# Patient Record
Sex: FEMALE | Race: WHITE | NOT HISPANIC OR LATINO
[De-identification: names, ages, dates, MRNs, and addresses within clinical notes are randomized per-mention and may not be internally consistent; named-entity substitution may affect disease eponyms.]

---

## 2021-11-04 ENCOUNTER — APPOINTMENT (OUTPATIENT)
Dept: PEDIATRIC ORTHOPEDIC SURGERY | Facility: CLINIC | Age: 71
End: 2021-11-04
Payer: MEDICARE

## 2021-11-04 VITALS — WEIGHT: 160 LBS | BODY MASS INDEX: 29.44 KG/M2 | HEIGHT: 62 IN

## 2021-11-04 DIAGNOSIS — Z83.3 FAMILY HISTORY OF DIABETES MELLITUS: ICD-10-CM

## 2021-11-04 DIAGNOSIS — Z80.9 FAMILY HISTORY OF MALIGNANT NEOPLASM, UNSPECIFIED: ICD-10-CM

## 2021-11-04 DIAGNOSIS — Z82.49 FAMILY HISTORY OF ISCHEMIC HEART DISEASE AND OTHER DISEASES OF THE CIRCULATORY SYSTEM: ICD-10-CM

## 2021-11-04 DIAGNOSIS — Z86.79 PERSONAL HISTORY OF OTHER DISEASES OF THE CIRCULATORY SYSTEM: ICD-10-CM

## 2021-11-04 DIAGNOSIS — Z82.61 FAMILY HISTORY OF ARTHRITIS: ICD-10-CM

## 2021-11-04 DIAGNOSIS — S46.011A STRAIN OF MUSCLE(S) AND TENDON(S) OF THE ROTATOR CUFF OF RIGHT SHOULDER, INITIAL ENCOUNTER: ICD-10-CM

## 2021-11-04 PROBLEM — Z00.00 ENCOUNTER FOR PREVENTIVE HEALTH EXAMINATION: Status: ACTIVE | Noted: 2021-11-04

## 2021-11-04 PROCEDURE — 99212 OFFICE O/P EST SF 10 MIN: CPT

## 2021-11-04 PROCEDURE — 73030 X-RAY EXAM OF SHOULDER: CPT

## 2021-11-04 RX ORDER — LEVOTHYROXINE SODIUM 200 UG/1
CAPSULE ORAL
Refills: 0 | Status: ACTIVE | COMMUNITY

## 2021-11-04 RX ORDER — SIMVASTATIN 20 MG/1
20 TABLET, FILM COATED ORAL
Refills: 0 | Status: ACTIVE | COMMUNITY

## 2021-11-04 RX ORDER — MELOXICAM 15 MG/1
15 TABLET ORAL
Qty: 30 | Refills: 0 | Status: ACTIVE | COMMUNITY
Start: 2021-11-04 | End: 1900-01-01

## 2021-11-04 RX ORDER — LISINOPRIL 5 MG/1
5 TABLET ORAL
Refills: 0 | Status: ACTIVE | COMMUNITY

## 2021-11-04 NOTE — PHYSICAL EXAM
[de-identified] : Exam today reveals mild restriction of full abduction and forward flexion in the scapular plane to the right shoulder along with mild restriction of external rotation.  There is mild crepitus on motion of the shoulder no evidence of instability.  Her strength is appropriate for her age.\par \par X-rays of the right shoulder taken today 2 views reveal mild degenerative changes spur formation no lesion.

## 2021-11-04 NOTE — ASSESSMENT
[FreeTextEntry1] : Impression: Strain right rotator cuff.\par \par This patient will be treated with Mobic and will return as necessary

## 2021-12-09 RX ORDER — MELOXICAM 15 MG/1
15 TABLET ORAL
Qty: 30 | Refills: 1 | Status: ACTIVE | COMMUNITY
Start: 2021-12-09 | End: 1900-01-01

## 2022-05-10 ENCOUNTER — APPOINTMENT (OUTPATIENT)
Dept: PEDIATRIC ORTHOPEDIC SURGERY | Facility: CLINIC | Age: 72
End: 2022-05-10

## 2022-06-03 ENCOUNTER — APPOINTMENT (OUTPATIENT)
Dept: PEDIATRIC ORTHOPEDIC SURGERY | Facility: CLINIC | Age: 72
End: 2022-06-03
Payer: MEDICARE

## 2022-06-03 VITALS — BODY MASS INDEX: 29.08 KG/M2 | HEIGHT: 62 IN | WEIGHT: 158 LBS

## 2022-06-03 PROCEDURE — 99212 OFFICE O/P EST SF 10 MIN: CPT

## 2022-06-03 PROCEDURE — 72100 X-RAY EXAM L-S SPINE 2/3 VWS: CPT

## 2022-06-03 RX ORDER — DICLOFENAC SODIUM 75 MG/1
75 TABLET, DELAYED RELEASE ORAL TWICE DAILY
Qty: 60 | Refills: 1 | Status: ACTIVE | COMMUNITY
Start: 2022-06-03 | End: 1900-01-01

## 2022-06-03 RX ORDER — CYCLOBENZAPRINE HYDROCHLORIDE 10 MG/1
10 TABLET, FILM COATED ORAL TWICE DAILY
Qty: 30 | Refills: 1 | Status: ACTIVE | COMMUNITY
Start: 2022-06-03 | End: 1900-01-01

## 2022-06-03 NOTE — PHYSICAL EXAM
[de-identified] : Exam today reveals a normal stance and gait she does however have painful motion moderately restricted in all planes to the lumbar segment.  Spasm and tenderness is noted on both sides of the midline slightly more so on the left.  No obvious trigger points present.  Posterior pelvic wall is nontender.  Both lower extremities are symmetric in appearance without atrophy and move well at all levels.  Straight leg raising is uncomfortable on the left though otherwise negative.  She is neurologically intact.\par \par X-rays of the lumbar spine taken today reveal significant narrowing the lower lumbar interspaces more so L5-S1 with facet arthritis spondylosis no lesion

## 2022-06-03 NOTE — ASSESSMENT
[FreeTextEntry1] : Impression: Lumbar radiculitis.\par \par She will be treated with diclofenac and Flexeril in the evening.  Physical therapy has been ordered.  If she is not improving MRI may then become necessary she will return as necessary

## 2022-06-03 NOTE — HISTORY OF PRESENT ILLNESS
[de-identified] : This 71-year-old is seen with a 2-month history of back pain radiating into the left lower extremity.  This after sitting at a lunch table for approximately 4 hours.  She has not had any motor weakness bladder or bowel dysfunction.  She has had back pain in past years.  She otherwise has been functioning well

## 2022-06-14 RX ORDER — MELOXICAM 15 MG/1
15 TABLET ORAL
Qty: 30 | Refills: 1 | Status: ACTIVE | COMMUNITY
Start: 2022-06-14 | End: 1900-01-01

## 2022-07-14 ENCOUNTER — TRANSCRIPTION ENCOUNTER (OUTPATIENT)
Age: 72
End: 2022-07-14

## 2022-07-14 ENCOUNTER — APPOINTMENT (OUTPATIENT)
Dept: PEDIATRIC ORTHOPEDIC SURGERY | Facility: CLINIC | Age: 72
End: 2022-07-14

## 2022-07-14 VITALS — HEIGHT: 62 IN | WEIGHT: 158 LBS | BODY MASS INDEX: 29.08 KG/M2

## 2022-07-14 DIAGNOSIS — M54.16 RADICULOPATHY, LUMBAR REGION: ICD-10-CM

## 2022-07-14 DIAGNOSIS — M70.62 TROCHANTERIC BURSITIS, LEFT HIP: ICD-10-CM

## 2022-07-14 DIAGNOSIS — M79.18 MYALGIA, OTHER SITE: ICD-10-CM

## 2022-07-14 PROCEDURE — 72100 X-RAY EXAM L-S SPINE 2/3 VWS: CPT

## 2022-07-14 PROCEDURE — 20552 NJX 1/MLT TRIGGER POINT 1/2: CPT | Mod: 59

## 2022-07-14 PROCEDURE — 99213 OFFICE O/P EST LOW 20 MIN: CPT | Mod: 25

## 2022-07-14 PROCEDURE — 72170 X-RAY EXAM OF PELVIS: CPT

## 2022-07-14 PROCEDURE — 20610 DRAIN/INJ JOINT/BURSA W/O US: CPT | Mod: LT

## 2022-07-14 RX ORDER — KETOROLAC TROMETHAMINE 10 MG/1
10 TABLET, FILM COATED ORAL 3 TIMES DAILY
Qty: 12 | Refills: 0 | Status: ACTIVE | COMMUNITY
Start: 2022-07-14 | End: 1900-01-01

## 2022-07-14 NOTE — ASSESSMENT
[FreeTextEntry1] : Impression: Lumbar radiculitis myalgias lumbar spine.  Trochanteric bursitis left hip\par \par The trigger point has been injected with Kenalog and lidocaine.  I have placed her on Toradol with GI precautions.  She will continue with Flexeril.  If she is not improving an MRI will be ordered with the potential for spinal epidural injections and pain management to follow

## 2022-07-14 NOTE — HISTORY OF PRESENT ILLNESS
[de-identified] : This 72-year-old returns today she has had ongoing back pain but however over the past 10 days she has had a marked increase in her pain with pain that radiates into the left proximal lateral thigh.  She was doing a lot of bending and standing and this seems to have significantly increased her pain.  She is also had numbness and tingling extending distally into the foot.  Her general health has been good.  She did take diclofenac and Flexeril with only minimal improvement.

## 2022-07-14 NOTE — PHYSICAL EXAM
[de-identified] : Her exam today reveals she is obviously very uncomfortable with an antalgic gait.  She has poor motion to the lumbar spine in all planes.  Spasm and tenderness is noted more so on the left side where there is a trigger point over the left SI joint region.  She has good motion to both hips.  Straight leg raising is positive on the left at 45 degrees.  Neurologically her motor function is fine.\par \par X-rays ordered and taken of the lumbar spine today reveal significant narrowing at L5-S1 along with spondylosis spur formation facet arthritis no lesion.  X-ray of the pelvis reveal mild degenerative joint disease to the hips left greater than right.

## 2022-07-14 NOTE — PROCEDURE
[FreeTextEntry1] : Under sterile technique with an alcohol prep the trigger point in the left lumbar region was injected with 40 mg of Kenalog and 4 cc of 1% lidocaine with a Band-Aid dressing applied at the conclusion this was tolerated without incident\par \par Under sterile technique with a Betadine prep the left trochanteric bursa was injected with 80 mg of Kenalog and 8 cc of 1% lidocaine with a Band-Aid dressing applied at the conclusion this was tolerated without incident.

## 2022-07-19 RX ORDER — METHYLPREDNISOLONE 4 MG/1
4 TABLET ORAL
Qty: 1 | Refills: 1 | Status: ACTIVE | COMMUNITY
Start: 2022-07-19 | End: 1900-01-01

## 2022-08-22 RX ORDER — MELOXICAM 15 MG/1
15 TABLET ORAL
Qty: 30 | Refills: 1 | Status: ACTIVE | COMMUNITY
Start: 2022-08-22 | End: 1900-01-01

## 2022-08-24 RX ORDER — MELOXICAM 15 MG/1
15 TABLET ORAL
Qty: 30 | Refills: 0 | Status: ACTIVE | COMMUNITY
Start: 2022-08-24 | End: 1900-01-01

## 2022-11-18 ENCOUNTER — OFFICE (OUTPATIENT)
Dept: URBAN - METROPOLITAN AREA CLINIC 29 | Facility: CLINIC | Age: 72
Setting detail: OPHTHALMOLOGY
End: 2022-11-18
Payer: MEDICARE

## 2022-11-18 DIAGNOSIS — H01.002: ICD-10-CM

## 2022-11-18 DIAGNOSIS — H01.005: ICD-10-CM

## 2022-11-18 DIAGNOSIS — H00.15: ICD-10-CM

## 2022-11-18 DIAGNOSIS — H02.824: ICD-10-CM

## 2022-11-18 DIAGNOSIS — H16.223: ICD-10-CM

## 2022-11-18 PROCEDURE — 99213 OFFICE O/P EST LOW 20 MIN: CPT | Performed by: OPHTHALMOLOGY

## 2022-11-18 ASSESSMENT — LID EXAM ASSESSMENTS
OS_BLEPHARITIS: 1+
OD_BLEPHARITIS: 1+
OS_EDEMA: LLL LUL 3+
OS_COMMENTS: LLL

## 2022-11-18 ASSESSMENT — SPHEQUIV_DERIVED
OD_SPHEQUIV: 0
OS_SPHEQUIV: -0.375
OD_SPHEQUIV: -0.25
OS_SPHEQUIV: 0.125

## 2022-11-18 ASSESSMENT — REFRACTION_AUTOREFRACTION
OS_CYLINDER: +0.75
OD_CYLINDER: +0.50
OS_SPHERE: -0.25
OD_AXIS: 175
OD_SPHERE: -0.25
OS_AXIS: 39

## 2022-11-18 ASSESSMENT — REFRACTION_MANIFEST
OS_CYLINDER: +0.75
OS_SPHERE: -0.75
OS_VA1: 20/20-2
OD_SPHERE: -0.50
OS_AXIS: 50
OU_VA: 20/20-
OD_AXIS: 175
OD_VA1: 20/25+2
OD_CYLINDER: +0.50

## 2022-11-18 ASSESSMENT — SUPERFICIAL PUNCTATE KERATITIS (SPK)
OD_SPK: 1+
OS_SPK: 1+

## 2022-11-18 ASSESSMENT — VISUAL ACUITY
OS_BCVA: 20/25
OD_BCVA: 20/25+2

## 2022-11-18 ASSESSMENT — CONFRONTATIONAL VISUAL FIELD TEST (CVF)
OD_FINDINGS: FULL
OS_FINDINGS: FULL

## 2023-01-23 ENCOUNTER — OFFICE (OUTPATIENT)
Dept: URBAN - METROPOLITAN AREA CLINIC 29 | Facility: CLINIC | Age: 73
Setting detail: OPHTHALMOLOGY
End: 2023-01-23
Payer: MEDICARE

## 2023-01-23 DIAGNOSIS — H47.093: ICD-10-CM

## 2023-01-23 DIAGNOSIS — H16.223: ICD-10-CM

## 2023-01-23 DIAGNOSIS — H01.001: ICD-10-CM

## 2023-01-23 DIAGNOSIS — H02.824: ICD-10-CM

## 2023-01-23 DIAGNOSIS — H01.004: ICD-10-CM

## 2023-01-23 PROCEDURE — 92014 COMPRE OPH EXAM EST PT 1/>: CPT | Performed by: OPHTHALMOLOGY

## 2023-01-23 PROCEDURE — 92083 EXTENDED VISUAL FIELD XM: CPT | Performed by: OPHTHALMOLOGY

## 2023-01-23 PROCEDURE — 92133 CPTRZD OPH DX IMG PST SGM ON: CPT | Performed by: OPHTHALMOLOGY

## 2023-01-23 ASSESSMENT — REFRACTION_AUTOREFRACTION
OD_SPHERE: 0.00
OS_SPHERE: 0.00
OS_CYLINDER: +0.75
OD_AXIS: 175
OS_AXIS: 45
OD_CYLINDER: +0.50

## 2023-01-23 ASSESSMENT — DECREASING TEAR LAKE - SEVERITY SCORE
OD_DEC_TEARLAKE: 1+
OS_DEC_TEARLAKE: 1+

## 2023-01-23 ASSESSMENT — REFRACTION_MANIFEST
OS_VA1: 20/20-2
OD_SPHERE: -0.50
OS_SPHERE: -0.75
OS_CYLINDER: +0.75
OD_VA1: 20/25+2
OS_AXIS: 50
OD_AXIS: 175
OD_CYLINDER: +0.50
OU_VA: 20/20-

## 2023-01-23 ASSESSMENT — SPHEQUIV_DERIVED
OD_SPHEQUIV: -0.25
OS_SPHEQUIV: -0.375
OS_SPHEQUIV: 0.375
OD_SPHEQUIV: 0.25

## 2023-01-23 ASSESSMENT — CONFRONTATIONAL VISUAL FIELD TEST (CVF)
OS_FINDINGS: FULL
OD_FINDINGS: FULL

## 2023-01-23 ASSESSMENT — LID EXAM ASSESSMENTS
OS_BLEPHARITIS: LUL 1+
OD_BLEPHARITIS: RUL 1+

## 2023-01-23 ASSESSMENT — TONOMETRY
OS_IOP_MMHG: 18
OD_IOP_MMHG: 18

## 2023-01-23 ASSESSMENT — SUPERFICIAL PUNCTATE KERATITIS (SPK)
OD_SPK: 1+
OS_SPK: 1+

## 2023-01-23 ASSESSMENT — VISUAL ACUITY
OD_BCVA: 20/25
OS_BCVA: 20/25

## 2024-01-29 ENCOUNTER — OFFICE (OUTPATIENT)
Dept: URBAN - METROPOLITAN AREA CLINIC 29 | Facility: CLINIC | Age: 74
Setting detail: OPHTHALMOLOGY
End: 2024-01-29
Payer: MEDICARE

## 2024-01-29 DIAGNOSIS — H02.824: ICD-10-CM

## 2024-01-29 DIAGNOSIS — H47.093: ICD-10-CM

## 2024-01-29 DIAGNOSIS — H16.223: ICD-10-CM

## 2024-01-29 DIAGNOSIS — H01.004: ICD-10-CM

## 2024-01-29 DIAGNOSIS — H01.001: ICD-10-CM

## 2024-01-29 PROCEDURE — 99214 OFFICE O/P EST MOD 30 MIN: CPT | Performed by: OPHTHALMOLOGY

## 2024-01-29 ASSESSMENT — REFRACTION_MANIFEST
OD_AXIS: 175
OS_VA1: 20/20-2
OS_AXIS: 50
OD_VA1: 20/25+2
OD_SPHERE: -0.50
OS_CYLINDER: +0.75
OD_CYLINDER: +0.50
OS_SPHERE: -0.75
OU_VA: 20/20-

## 2024-01-29 ASSESSMENT — LID EXAM ASSESSMENTS
OD_BLEPHARITIS: RUL 1+
OS_BLEPHARITIS: LUL 1+

## 2024-01-29 ASSESSMENT — SPHEQUIV_DERIVED
OD_SPHEQUIV: -0.25
OS_SPHEQUIV: -0.375
OD_SPHEQUIV: -0.125
OS_SPHEQUIV: -0.125

## 2024-01-29 ASSESSMENT — DECREASING TEAR LAKE - SEVERITY SCORE
OD_DEC_TEARLAKE: 1+
OS_DEC_TEARLAKE: 1+

## 2024-01-29 ASSESSMENT — REFRACTION_AUTOREFRACTION
OS_AXIS: 051
OD_CYLINDER: +0.25
OD_AXIS: 168
OD_SPHERE: -0.25
OS_CYLINDER: +0.75
OS_SPHERE: -0.50

## 2024-01-29 ASSESSMENT — SUPERFICIAL PUNCTATE KERATITIS (SPK)
OS_SPK: 1+
OD_SPK: 1+

## 2024-01-29 ASSESSMENT — CONFRONTATIONAL VISUAL FIELD TEST (CVF)
OS_FINDINGS: FULL
OD_FINDINGS: FULL

## 2024-04-08 ENCOUNTER — APPOINTMENT (OUTPATIENT)
Dept: PEDIATRIC ORTHOPEDIC SURGERY | Facility: CLINIC | Age: 74
End: 2024-04-08

## 2024-05-28 ENCOUNTER — APPOINTMENT (OUTPATIENT)
Dept: PEDIATRIC ORTHOPEDIC SURGERY | Facility: CLINIC | Age: 74
End: 2024-05-28

## 2024-07-30 ENCOUNTER — OFFICE (OUTPATIENT)
Dept: URBAN - METROPOLITAN AREA CLINIC 29 | Facility: CLINIC | Age: 74
Setting detail: OPHTHALMOLOGY
End: 2024-07-30
Payer: MEDICARE

## 2024-07-30 DIAGNOSIS — H47.093: ICD-10-CM

## 2024-07-30 DIAGNOSIS — H16.223: ICD-10-CM

## 2024-07-30 DIAGNOSIS — H01.001: ICD-10-CM

## 2024-07-30 DIAGNOSIS — H02.824: ICD-10-CM

## 2024-07-30 DIAGNOSIS — H01.004: ICD-10-CM

## 2024-07-30 PROCEDURE — 92083 EXTENDED VISUAL FIELD XM: CPT | Performed by: OPHTHALMOLOGY

## 2024-07-30 PROCEDURE — 99214 OFFICE O/P EST MOD 30 MIN: CPT | Performed by: OPHTHALMOLOGY

## 2024-07-30 ASSESSMENT — LID EXAM ASSESSMENTS
OS_BLEPHARITIS: LUL 1+
OD_BLEPHARITIS: RUL 1+

## 2024-07-30 ASSESSMENT — CONFRONTATIONAL VISUAL FIELD TEST (CVF)
OS_FINDINGS: FULL
OD_FINDINGS: FULL

## 2024-09-24 ENCOUNTER — APPOINTMENT (OUTPATIENT)
Dept: PEDIATRIC ORTHOPEDIC SURGERY | Facility: CLINIC | Age: 74
End: 2024-09-24
Payer: MEDICARE

## 2024-09-24 VITALS
HEIGHT: 62 IN | DIASTOLIC BLOOD PRESSURE: 80 MMHG | TEMPERATURE: 96.9 F | SYSTOLIC BLOOD PRESSURE: 130 MMHG | BODY MASS INDEX: 30.73 KG/M2 | WEIGHT: 167 LBS

## 2024-09-24 DIAGNOSIS — M54.16 RADICULOPATHY, LUMBAR REGION: ICD-10-CM

## 2024-09-24 DIAGNOSIS — M79.18 MYALGIA, OTHER SITE: ICD-10-CM

## 2024-09-24 PROCEDURE — 99212 OFFICE O/P EST SF 10 MIN: CPT

## 2024-09-24 RX ORDER — MELOXICAM 15 MG/1
15 TABLET ORAL
Qty: 30 | Refills: 1 | Status: ACTIVE | COMMUNITY
Start: 2024-09-24 | End: 1900-01-01

## 2024-09-24 NOTE — PHYSICAL EXAM
[de-identified] : Exam today mildly overweight she has normal stance and gait no limp maintains good motion to the lumbar spine mild spasm and tenderness is noted more so left side extending into the left posterior pelvic wall.  No obvious trigger points there are no tension signs present she is neurologically intact

## 2024-09-24 NOTE — ASSESSMENT
[FreeTextEntry1] : Impression: Lumbar radiculitis.  This patient will be treated with a course of Mobic with GI precautions the potential for steroid injection therapy has been discussed should she not improve

## 2024-09-24 NOTE — HISTORY OF PRESENT ILLNESS
[de-identified] : This 74-year-old returns she has had a recent flare of back pain radiating into the left lower extremity.  No injury no numbness paresthesias motor weakness bladder or bowel dysfunction.  Her general health since last seen has been good and stable

## 2025-01-29 ENCOUNTER — OFFICE (OUTPATIENT)
Dept: URBAN - METROPOLITAN AREA CLINIC 29 | Facility: CLINIC | Age: 75
Setting detail: OPHTHALMOLOGY
End: 2025-01-29
Payer: MEDICARE

## 2025-01-29 DIAGNOSIS — H02.824: ICD-10-CM

## 2025-01-29 DIAGNOSIS — H47.093: ICD-10-CM

## 2025-01-29 DIAGNOSIS — H01.001: ICD-10-CM

## 2025-01-29 DIAGNOSIS — H16.223: ICD-10-CM

## 2025-01-29 DIAGNOSIS — H01.004: ICD-10-CM

## 2025-01-29 PROCEDURE — 92014 COMPRE OPH EXAM EST PT 1/>: CPT | Performed by: OPHTHALMOLOGY

## 2025-01-29 ASSESSMENT — SUPERFICIAL PUNCTATE KERATITIS (SPK)
OD_SPK: 1+
OS_SPK: 1+

## 2025-01-29 ASSESSMENT — LID EXAM ASSESSMENTS
OS_BLEPHARITIS: LUL 1+
OD_BLEPHARITIS: RUL 1+

## 2025-01-29 ASSESSMENT — REFRACTION_AUTOREFRACTION
OS_CYLINDER: +0.75
OS_AXIS: 051
OD_SPHERE: -0.25
OD_AXIS: 168
OD_CYLINDER: +0.25
OS_SPHERE: -0.50

## 2025-01-29 ASSESSMENT — REFRACTION_MANIFEST
OD_CYLINDER: +0.50
OU_VA: 20/20-
OD_SPHERE: -0.50
OS_CYLINDER: +0.75
OS_SPHERE: -0.75
OD_VA1: 20/25+2
OS_AXIS: 50
OS_VA1: 20/20-2
OD_AXIS: 175

## 2025-01-29 ASSESSMENT — TONOMETRY
OS_IOP_MMHG: 15
OD_IOP_MMHG: 15

## 2025-01-29 ASSESSMENT — CONFRONTATIONAL VISUAL FIELD TEST (CVF)
OS_FINDINGS: FULL
OD_FINDINGS: FULL

## 2025-01-29 ASSESSMENT — DECREASING TEAR LAKE - SEVERITY SCORE
OD_DEC_TEARLAKE: 1+
OS_DEC_TEARLAKE: 1+

## 2025-01-29 ASSESSMENT — VISUAL ACUITY
OS_BCVA: 20/25-1
OD_BCVA: 20/25